# Patient Record
Sex: FEMALE | NOT HISPANIC OR LATINO | URBAN - METROPOLITAN AREA
[De-identification: names, ages, dates, MRNs, and addresses within clinical notes are randomized per-mention and may not be internally consistent; named-entity substitution may affect disease eponyms.]

---

## 2022-07-09 ENCOUNTER — TELEPHONE ENCOUNTER (OUTPATIENT)
Dept: URBAN - METROPOLITAN AREA CLINIC 121 | Facility: CLINIC | Age: 82
End: 2022-07-09

## 2022-07-10 ENCOUNTER — TELEPHONE ENCOUNTER (OUTPATIENT)
Dept: URBAN - METROPOLITAN AREA CLINIC 121 | Facility: CLINIC | Age: 82
End: 2022-07-10

## 2022-10-24 ENCOUNTER — OFFICE VISIT (OUTPATIENT)
Dept: URBAN - METROPOLITAN AREA CLINIC 60 | Facility: CLINIC | Age: 82
End: 2022-10-24
Payer: MEDICARE

## 2022-10-24 ENCOUNTER — WEB ENCOUNTER (OUTPATIENT)
Dept: URBAN - METROPOLITAN AREA CLINIC 60 | Facility: CLINIC | Age: 82
End: 2022-10-24

## 2022-10-24 VITALS
OXYGEN SATURATION: 97 % | RESPIRATION RATE: 12 BRPM | WEIGHT: 162 LBS | DIASTOLIC BLOOD PRESSURE: 78 MMHG | TEMPERATURE: 98.8 F | HEART RATE: 68 BPM | BODY MASS INDEX: 27.66 KG/M2 | HEIGHT: 64 IN | SYSTOLIC BLOOD PRESSURE: 140 MMHG

## 2022-10-24 DIAGNOSIS — K80.20 CALCULUS OF GALLBLADDER WITHOUT CHOLECYSTITIS WITHOUT OBSTRUCTION: ICD-10-CM

## 2022-10-24 DIAGNOSIS — K59.00 CONSTIPATION, UNSPECIFIED CONSTIPATION TYPE: ICD-10-CM

## 2022-10-24 PROBLEM — 14760008 CONSTIPATION: Status: ACTIVE | Noted: 2022-10-24

## 2022-10-24 PROBLEM — 70342003 CHOLELITHIASIS WITHOUT OBSTRUCTION: Status: ACTIVE | Noted: 2022-10-24

## 2022-10-24 PROCEDURE — 99204 OFFICE O/P NEW MOD 45 MIN: CPT | Performed by: INTERNAL MEDICINE

## 2022-10-24 RX ORDER — PYRIDOSTIGMINE BROMIDE 60 MG/1
TAKE ONE TABLET BY MOUTH TWICE A DAY TABLET ORAL
Qty: 100 UNSPECIFIED | Refills: 1 | Status: ACTIVE | COMMUNITY

## 2022-10-24 RX ORDER — FUROSEMIDE 20 MG/1
TAKE ONE TABLET BY MOUTH ONE TIME DAILY TABLET ORAL
Qty: 90 UNSPECIFIED | Refills: 1 | Status: ACTIVE | COMMUNITY

## 2022-10-24 RX ORDER — POTASSIUM CHLORIDE 10 MEQ/1
TAKE TWO TABLETS BY MOUTH ONE TIME DAILY WITH FUROSEMIDE TABLET, EXTENDED RELEASE ORAL
Qty: 180 UNSPECIFIED | Refills: 1 | Status: ACTIVE | COMMUNITY

## 2022-10-24 NOTE — HPI-TODAY'S VISIT:
Here to establish care. Long standing history of reflux symptoms and chronic constipation. She takes multiple OTC supplements for various complaints. She tends to have a bowel movement every day if not every other day. Does not complain of any bleeding or melena. Appetite has been good, weight has been steady. She has had abdominal pain in the past, now CT scan had showed cholelithiasis but she has not had any problems with this. Her last colonoscopy was in 2014 which reportedly was normal. He is not keen on having a repeat colonoscopy given that she had a rectal cyst postprocedure which weeks to get better. No weight loss reported.

## 2023-11-29 ENCOUNTER — OFFICE VISIT (OUTPATIENT)
Dept: URBAN - METROPOLITAN AREA CLINIC 63 | Facility: CLINIC | Age: 83
End: 2023-11-29
Payer: MEDICARE

## 2023-11-29 ENCOUNTER — DASHBOARD ENCOUNTERS (OUTPATIENT)
Age: 83
End: 2023-11-29

## 2023-11-29 VITALS — BODY MASS INDEX: 28 KG/M2 | TEMPERATURE: 97.3 F | WEIGHT: 164 LBS | HEIGHT: 64 IN

## 2023-11-29 DIAGNOSIS — Z86.010 HISTORY OF COLONIC POLYPS: ICD-10-CM

## 2023-11-29 PROBLEM — 428283002: Status: ACTIVE | Noted: 2023-11-29

## 2023-11-29 PROCEDURE — 99213 OFFICE O/P EST LOW 20 MIN: CPT | Performed by: INTERNAL MEDICINE

## 2023-11-29 RX ORDER — PYRIDOSTIGMINE BROMIDE 60 MG/1
TAKE ONE TABLET BY MOUTH TWICE A DAY TABLET ORAL
Qty: 100 UNSPECIFIED | Refills: 1 | COMMUNITY

## 2023-11-29 RX ORDER — FUROSEMIDE 20 MG/1
TAKE ONE TABLET BY MOUTH ONE TIME DAILY TABLET ORAL
Qty: 90 UNSPECIFIED | Refills: 1 | COMMUNITY

## 2023-11-29 RX ORDER — POTASSIUM CHLORIDE 10 MEQ/1
TAKE TWO TABLETS BY MOUTH ONE TIME DAILY WITH FUROSEMIDE TABLET, EXTENDED RELEASE ORAL
Qty: 180 UNSPECIFIED | Refills: 1 | COMMUNITY

## 2023-11-29 NOTE — HPI-ZZZTODAY'S VISIT
Louise is a very pleasant 83-year-old female who presents for annual follow-up and colonoscopy screening.  Past medical history significant for colon polyp polyps, GERD, [place]. Past surgical history significant for dental implants and appendectomy.  Last colonoscopy 7/26/2018.  EGD negative.  Patient denies family history of colon polyps or GI malignancy.  Gloria is asymptomatic from a GI perspective.  She reports 1 easy to pass, brown bowel movement per day.  She denies dysphagia, pyrosis, dyspepsia, unintentional weight loss, abdominal pain, melena, or hematochezia.